# Patient Record
Sex: FEMALE | Race: OTHER
[De-identification: names, ages, dates, MRNs, and addresses within clinical notes are randomized per-mention and may not be internally consistent; named-entity substitution may affect disease eponyms.]

---

## 2017-02-21 ENCOUNTER — HOSPITAL ENCOUNTER (EMERGENCY)
Dept: HOSPITAL 54 - ER | Age: 52
Discharge: HOME | End: 2017-02-21
Payer: COMMERCIAL

## 2017-02-21 VITALS — DIASTOLIC BLOOD PRESSURE: 99 MMHG | SYSTOLIC BLOOD PRESSURE: 162 MMHG

## 2017-02-21 VITALS — BODY MASS INDEX: 24.99 KG/M2 | HEIGHT: 65 IN | WEIGHT: 150 LBS

## 2017-02-21 DIAGNOSIS — G89.4: ICD-10-CM

## 2017-02-21 DIAGNOSIS — G40.909: ICD-10-CM

## 2017-02-21 DIAGNOSIS — F13.239: Primary | ICD-10-CM

## 2017-02-21 DIAGNOSIS — F17.200: ICD-10-CM

## 2017-02-21 DIAGNOSIS — Z88.6: ICD-10-CM

## 2017-02-21 DIAGNOSIS — F41.9: ICD-10-CM

## 2017-02-21 LAB
*LACTIC ACID REFLEX FLAG: YES
ADD UA MICROSCOPIC: YES
ALBUMIN SERPL BCP-MCNC: 4.2 G/DL (ref 3.4–5)
ALT SERPL W P-5'-P-CCNC: 170 U/L (ref 12–78)
ANION GAP SERPL CALC-SCNC: 12 MMOL/L (ref 5–14)
AST SERPL W P-5'-P-CCNC: 132 U/L (ref 15–37)
BACTERIA UR CULT: NO
BASOPHILS # BLD AUTO: 0 /CMM (ref 0–0.2)
BASOPHILS NFR BLD AUTO: 0.6 % (ref 0–2)
BILIRUB DIRECT SERPL-MCNC: 0.3 MG/DL (ref 0–0.2)
BILIRUB INDIRECT SERPL-MCNC: 0.9 MG/DL (ref 0–1.1)
BILIRUB SERPL-MCNC: 1.2 MG/DL (ref 0.2–1)
BUN SERPL-MCNC: 11 MG/DL (ref 7–18)
CALCIUM SERPL-MCNC: 9.7 MG/DL (ref 8.5–10.1)
CHLORIDE SERPL-SCNC: 99 MMOL/L (ref 98–107)
CO2 SERPL-SCNC: 31 MMOL/L (ref 21–32)
CREAT SERPL-MCNC: 0.8 MG/DL (ref 0.6–1.3)
DIFF TOTAL %: 100 %
EOSINOPHIL # BLD AUTO: 0 /CMM (ref 0–0.7)
EOSINOPHIL NFR BLD AUTO: 0.2 % (ref 0–6)
GFR SERPLBLD BASED ON 1.73 SQ M-ARVRAT: 75 ML/MIN (ref 60–?)
GLUCOSE SERPL-MCNC: 121 MG/DL (ref 74–106)
HCT VFR BLD AUTO: 46 % (ref 33–45)
HGB BLD-MCNC: 15.1 G/DL (ref 11.5–14.8)
LACTATE SERPL-SCNC: 2.2 MMOL/L (ref 0.4–2)
LYMPHOCYTES NFR BLD AUTO: 0.9 /CMM (ref 0.8–4.8)
LYMPHOCYTES NFR BLD AUTO: 12.5 % (ref 20–44)
MCH RBC QN AUTO: 30 PG (ref 26–33)
MCHC RBC AUTO-ENTMCNC: 33 G/DL (ref 31–36)
MCV RBC AUTO: 91 FL (ref 82–100)
MONOCYTES NFR BLD AUTO: 0.6 /CMM (ref 0.1–1.3)
MONOCYTES NFR BLD AUTO: 9.2 % (ref 2–12)
NEUTROPHILS # BLD AUTO: 5.4 /CMM (ref 1.8–8.9)
NEUTROPHILS NFR BLD AUTO: 77.5 % (ref 43–81)
PH UR STRIP: 7 [PH] (ref 5–8)
PLATELET # BLD AUTO: 185 /CMM (ref 150–450)
POTASSIUM SERPL-SCNC: 4 MMOL/L (ref 3.5–5.1)
PROT SERPL-MCNC: 9.6 G/DL (ref 6.4–8.2)
RBC # BLD AUTO: 5.03 MIL/UL (ref 4–5.2)
RBC #/AREA URNS HPF: (no result) /HPF (ref 0–2)
SODIUM SERPL-SCNC: 138 MMOL/L (ref 136–145)
TROPONIN I SERPL-MCNC: < 0.017 NG/ML (ref 0–0.06)
WBC #/AREA URNS HPF: (no result) /HPF (ref 0–3)
WBC NRBC COR # BLD AUTO: 6.9 K/UL (ref 4.3–11)

## 2017-02-21 PROCEDURE — 80048 BASIC METABOLIC PNL TOTAL CA: CPT

## 2017-02-21 PROCEDURE — 81001 URINALYSIS AUTO W/SCOPE: CPT

## 2017-02-21 PROCEDURE — 85025 COMPLETE CBC W/AUTO DIFF WBC: CPT

## 2017-02-21 PROCEDURE — Z7610: HCPCS

## 2017-02-21 PROCEDURE — G0480 DRUG TEST DEF 1-7 CLASSES: HCPCS

## 2017-02-21 PROCEDURE — 83605 ASSAY OF LACTIC ACID: CPT

## 2017-02-21 PROCEDURE — 36415 COLL VENOUS BLD VENIPUNCTURE: CPT

## 2017-02-21 PROCEDURE — 96361 HYDRATE IV INFUSION ADD-ON: CPT

## 2017-02-21 PROCEDURE — 99285 EMERGENCY DEPT VISIT HI MDM: CPT

## 2017-02-21 PROCEDURE — 96374 THER/PROPH/DIAG INJ IV PUSH: CPT

## 2017-02-21 PROCEDURE — 80076 HEPATIC FUNCTION PANEL: CPT

## 2017-02-21 PROCEDURE — 70450 CT HEAD/BRAIN W/O DYE: CPT

## 2017-02-21 PROCEDURE — 84484 ASSAY OF TROPONIN QUANT: CPT

## 2017-02-21 PROCEDURE — 93005 ELECTROCARDIOGRAM TRACING: CPT

## 2017-02-21 PROCEDURE — A4606 OXYGEN PROBE USED W OXIMETER: HCPCS

## 2020-09-13 ENCOUNTER — HOSPITAL ENCOUNTER (INPATIENT)
Dept: HOSPITAL 12 - ER | Age: 55
LOS: 2 days | Discharge: NURSING, REHAB OR LONG-TERM CARE | DRG: 347 | End: 2020-09-15
Payer: COMMERCIAL

## 2020-09-13 VITALS — DIASTOLIC BLOOD PRESSURE: 77 MMHG | SYSTOLIC BLOOD PRESSURE: 142 MMHG

## 2020-09-13 VITALS — HEIGHT: 66 IN | WEIGHT: 155.13 LBS | BODY MASS INDEX: 24.93 KG/M2
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
## 2020-09-13 DIAGNOSIS — M47.896: ICD-10-CM

## 2020-09-13 DIAGNOSIS — I10: ICD-10-CM

## 2020-09-13 DIAGNOSIS — F12.90: ICD-10-CM

## 2020-09-13 DIAGNOSIS — Z79.891: ICD-10-CM
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
## 2020-09-13 DIAGNOSIS — F41.9: ICD-10-CM

## 2020-09-13 DIAGNOSIS — R74.0: ICD-10-CM

## 2020-09-13 DIAGNOSIS — Z79.899: ICD-10-CM
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
## 2020-09-13 DIAGNOSIS — G89.4: ICD-10-CM

## 2020-09-13 DIAGNOSIS — G40.909: ICD-10-CM

## 2020-09-13 DIAGNOSIS — M51.36: ICD-10-CM

## 2020-09-13 DIAGNOSIS — E66.9: ICD-10-CM

## 2020-09-13 DIAGNOSIS — S32.019A: Primary | ICD-10-CM

## 2020-09-13 DIAGNOSIS — R79.89: ICD-10-CM

## 2020-09-13 LAB
ALP SERPL-CCNC: 106 U/L (ref 50–136)
ALT SERPL W/O P-5'-P-CCNC: 214 U/L (ref 14–59)
AST SERPL-CCNC: 150 U/L (ref 15–37)
BASOPHILS # BLD AUTO: 0 K/UL (ref 0–8)
BASOPHILS NFR BLD AUTO: 0.5 % (ref 0–2)
BILIRUB SERPL-MCNC: 0.7 MG/DL (ref 0.2–1)
BUN SERPL-MCNC: 22 MG/DL (ref 7–18)
CHLORIDE SERPL-SCNC: 100 MMOL/L (ref 98–107)
CO2 SERPL-SCNC: 33 MMOL/L (ref 21–32)
CREAT SERPL-MCNC: 1.2 MG/DL (ref 0.6–1.3)
EOSINOPHIL # BLD AUTO: 0 K/UL (ref 0–0.7)
EOSINOPHIL NFR BLD AUTO: 0.2 % (ref 0–7)
GLUCOSE SERPL-MCNC: 86 MG/DL (ref 74–106)
HCT VFR BLD AUTO: 38.1 % (ref 31.2–41.9)
HGB BLD-MCNC: 13 G/DL (ref 10.9–14.3)
LYMPHOCYTES # BLD AUTO: 1.6 K/UL (ref 20–40)
LYMPHOCYTES NFR BLD AUTO: 17.4 % (ref 20.5–51.5)
MCH RBC QN AUTO: 31.1 UUG (ref 24.7–32.8)
MCHC RBC AUTO-ENTMCNC: 34 G/DL (ref 32.3–35.6)
MCV RBC AUTO: 91.1 FL (ref 75.5–95.3)
MONOCYTES # BLD AUTO: 1.1 K/UL (ref 2–10)
MONOCYTES NFR BLD AUTO: 11.4 % (ref 0–11)
NEUTROPHILS # BLD AUTO: 6.6 K/UL (ref 1.8–8.9)
NEUTROPHILS NFR BLD AUTO: 70.5 % (ref 38.5–71.5)
PLATELET # BLD AUTO: 201 K/UL (ref 179–408)
POTASSIUM SERPL-SCNC: 4.7 MMOL/L (ref 3.5–5.1)
RBC # BLD AUTO: 4.18 MIL/UL (ref 3.63–4.92)
WBC # BLD AUTO: 9.4 K/UL (ref 3.8–11.8)
WS STN SPEC: 9 G/DL (ref 6.4–8.2)

## 2020-09-13 PROCEDURE — G0378 HOSPITAL OBSERVATION PER HR: HCPCS

## 2020-09-13 PROCEDURE — A4663 DIALYSIS BLOOD PRESSURE CUFF: HCPCS

## 2020-09-13 RX ADMIN — LORAZEPAM PRN MG: 2 INJECTION INTRAMUSCULAR; INTRAVENOUS at 23:24

## 2020-09-13 RX ADMIN — DOCUSATE SODIUM SCH MG: 100 CAPSULE, LIQUID FILLED ORAL at 20:00

## 2020-09-13 RX ADMIN — HYDROMORPHONE HYDROCHLORIDE PRN MG: 1 INJECTION, SOLUTION INTRAMUSCULAR; INTRAVENOUS; SUBCUTANEOUS at 19:57

## 2020-09-14 VITALS — DIASTOLIC BLOOD PRESSURE: 66 MMHG | SYSTOLIC BLOOD PRESSURE: 120 MMHG

## 2020-09-14 VITALS — SYSTOLIC BLOOD PRESSURE: 98 MMHG | DIASTOLIC BLOOD PRESSURE: 62 MMHG

## 2020-09-14 VITALS — SYSTOLIC BLOOD PRESSURE: 115 MMHG | DIASTOLIC BLOOD PRESSURE: 77 MMHG

## 2020-09-14 VITALS — DIASTOLIC BLOOD PRESSURE: 66 MMHG | SYSTOLIC BLOOD PRESSURE: 122 MMHG

## 2020-09-14 LAB
BASOPHILS # BLD AUTO: 0 K/UL (ref 0–8)
BASOPHILS NFR BLD AUTO: 0.4 % (ref 0–2)
BUN SERPL-MCNC: 20 MG/DL (ref 7–18)
CHLORIDE SERPL-SCNC: 101 MMOL/L (ref 98–107)
CHOLEST SERPL-MCNC: 121 MG/DL (ref ?–200)
CO2 SERPL-SCNC: 33 MMOL/L (ref 21–32)
CREAT SERPL-MCNC: 1.1 MG/DL (ref 0.6–1.3)
EOSINOPHIL # BLD AUTO: 0.1 K/UL (ref 0–0.7)
EOSINOPHIL NFR BLD AUTO: 1.6 % (ref 0–7)
GLUCOSE SERPL-MCNC: 101 MG/DL (ref 74–106)
HCT VFR BLD AUTO: 37.5 % (ref 31.2–41.9)
HDLC SERPL-MCNC: 45 MG/DL (ref 40–60)
HGB BLD-MCNC: 12.5 G/DL (ref 10.9–14.3)
LYMPHOCYTES # BLD AUTO: 1.4 K/UL (ref 20–40)
LYMPHOCYTES NFR BLD AUTO: 23.4 % (ref 20.5–51.5)
MAGNESIUM SERPL-MCNC: 1.2 MG/DL (ref 1.8–2.4)
MCH RBC QN AUTO: 30.6 UUG (ref 24.7–32.8)
MCHC RBC AUTO-ENTMCNC: 33 G/DL (ref 32.3–35.6)
MCV RBC AUTO: 91.4 FL (ref 75.5–95.3)
MONOCYTES # BLD AUTO: 0.9 K/UL (ref 2–10)
MONOCYTES NFR BLD AUTO: 13.9 % (ref 0–11)
NEUTROPHILS # BLD AUTO: 3.7 K/UL (ref 1.8–8.9)
NEUTROPHILS NFR BLD AUTO: 60.7 % (ref 38.5–71.5)
PHOSPHATE SERPL-MCNC: 3.8 MG/DL (ref 2.5–4.9)
PLATELET # BLD AUTO: 165 K/UL (ref 179–408)
POTASSIUM SERPL-SCNC: 4.5 MMOL/L (ref 3.5–5.1)
RBC # BLD AUTO: 4.1 MIL/UL (ref 3.63–4.92)
TRIGL SERPL-MCNC: 51 MG/DL (ref 30–150)
WBC # BLD AUTO: 6.1 K/UL (ref 3.8–11.8)

## 2020-09-14 RX ADMIN — HYDROMORPHONE HYDROCHLORIDE PRN MG: 1 INJECTION, SOLUTION INTRAMUSCULAR; INTRAVENOUS; SUBCUTANEOUS at 11:54

## 2020-09-14 RX ADMIN — HYDROMORPHONE HYDROCHLORIDE PRN MG: 1 INJECTION, SOLUTION INTRAMUSCULAR; INTRAVENOUS; SUBCUTANEOUS at 01:20

## 2020-09-14 RX ADMIN — DOCUSATE SODIUM SCH MG: 100 CAPSULE, LIQUID FILLED ORAL at 20:05

## 2020-09-14 RX ADMIN — LORAZEPAM PRN MG: 2 INJECTION INTRAMUSCULAR; INTRAVENOUS at 19:47

## 2020-09-14 RX ADMIN — HYDROMORPHONE HYDROCHLORIDE PRN MG: 1 INJECTION, SOLUTION INTRAMUSCULAR; INTRAVENOUS; SUBCUTANEOUS at 06:19

## 2020-09-14 RX ADMIN — PANTOPRAZOLE SODIUM SCH MG: 40 TABLET, DELAYED RELEASE ORAL at 06:15

## 2020-09-15 VITALS — DIASTOLIC BLOOD PRESSURE: 94 MMHG | SYSTOLIC BLOOD PRESSURE: 138 MMHG

## 2020-09-15 VITALS — DIASTOLIC BLOOD PRESSURE: 76 MMHG | SYSTOLIC BLOOD PRESSURE: 126 MMHG

## 2020-09-15 VITALS — SYSTOLIC BLOOD PRESSURE: 148 MMHG | DIASTOLIC BLOOD PRESSURE: 102 MMHG

## 2020-09-15 LAB
BASOPHILS # BLD AUTO: 0 K/UL (ref 0–8)
BASOPHILS NFR BLD AUTO: 0.5 % (ref 0–2)
BUN SERPL-MCNC: 18 MG/DL (ref 7–18)
CHLORIDE SERPL-SCNC: 101 MMOL/L (ref 98–107)
CO2 SERPL-SCNC: 29 MMOL/L (ref 21–32)
CREAT SERPL-MCNC: 1 MG/DL (ref 0.6–1.3)
EOSINOPHIL # BLD AUTO: 0.1 K/UL (ref 0–0.7)
EOSINOPHIL NFR BLD AUTO: 1.5 % (ref 0–7)
GLUCOSE SERPL-MCNC: 85 MG/DL (ref 74–106)
HCT VFR BLD AUTO: 38 % (ref 31.2–41.9)
HGB BLD-MCNC: 12.9 G/DL (ref 10.9–14.3)
LYMPHOCYTES # BLD AUTO: 1.7 K/UL (ref 20–40)
LYMPHOCYTES NFR BLD AUTO: 28.3 % (ref 20.5–51.5)
MCH RBC QN AUTO: 31.2 UUG (ref 24.7–32.8)
MCHC RBC AUTO-ENTMCNC: 34 G/DL (ref 32.3–35.6)
MCV RBC AUTO: 92 FL (ref 75.5–95.3)
MONOCYTES # BLD AUTO: 0.8 K/UL (ref 2–10)
MONOCYTES NFR BLD AUTO: 13 % (ref 0–11)
NEUTROPHILS # BLD AUTO: 3.5 K/UL (ref 1.8–8.9)
NEUTROPHILS NFR BLD AUTO: 56.7 % (ref 38.5–71.5)
PLATELET # BLD AUTO: 182 K/UL (ref 179–408)
POTASSIUM SERPL-SCNC: 4.3 MMOL/L (ref 3.5–5.1)
RBC # BLD AUTO: 4.13 MIL/UL (ref 3.63–4.92)
WBC # BLD AUTO: 6.1 K/UL (ref 3.8–11.8)

## 2020-09-15 RX ADMIN — HYDROMORPHONE HYDROCHLORIDE PRN MG: 1 INJECTION, SOLUTION INTRAMUSCULAR; INTRAVENOUS; SUBCUTANEOUS at 00:55

## 2020-09-15 RX ADMIN — HYDROMORPHONE HYDROCHLORIDE PRN MG: 1 INJECTION, SOLUTION INTRAMUSCULAR; INTRAVENOUS; SUBCUTANEOUS at 15:43

## 2020-09-15 RX ADMIN — PANTOPRAZOLE SODIUM SCH MG: 40 TABLET, DELAYED RELEASE ORAL at 06:04

## 2020-09-15 RX ADMIN — HYDROMORPHONE HYDROCHLORIDE PRN MG: 1 INJECTION, SOLUTION INTRAMUSCULAR; INTRAVENOUS; SUBCUTANEOUS at 08:24

## 2022-02-16 ENCOUNTER — HOSPITAL ENCOUNTER (EMERGENCY)
Dept: HOSPITAL 54 - ER | Age: 57
Discharge: LEFT BEFORE DISCHARGE | End: 2022-02-16
Payer: COMMERCIAL

## 2022-02-16 VITALS — HEIGHT: 66 IN | WEIGHT: 180 LBS | BODY MASS INDEX: 28.93 KG/M2

## 2022-02-16 VITALS — SYSTOLIC BLOOD PRESSURE: 150 MMHG | DIASTOLIC BLOOD PRESSURE: 93 MMHG

## 2022-02-16 DIAGNOSIS — Z76.0: ICD-10-CM

## 2022-02-16 DIAGNOSIS — F41.9: ICD-10-CM

## 2022-02-16 DIAGNOSIS — Z53.21: Primary | ICD-10-CM

## 2022-02-16 NOTE — NUR
PT WAAS INFORMED THAT SHE WILL NOT BE PRESCRIBED W/ REQUESTED MEDICATION. LEFT 
WITHOUT BEING SEEN BY ED PROVIDER.

## 2022-05-22 ENCOUNTER — HOSPITAL ENCOUNTER (INPATIENT)
Dept: HOSPITAL 54 - ER | Age: 57
LOS: 3 days | Discharge: NURSING, REHAB OR LONG-TERM CARE | DRG: 207 | End: 2022-05-25
Attending: INTERNAL MEDICINE | Admitting: INTERNAL MEDICINE
Payer: COMMERCIAL

## 2022-05-22 VITALS — DIASTOLIC BLOOD PRESSURE: 69 MMHG | SYSTOLIC BLOOD PRESSURE: 116 MMHG

## 2022-05-22 VITALS — DIASTOLIC BLOOD PRESSURE: 64 MMHG | SYSTOLIC BLOOD PRESSURE: 100 MMHG

## 2022-05-22 VITALS — DIASTOLIC BLOOD PRESSURE: 70 MMHG | SYSTOLIC BLOOD PRESSURE: 108 MMHG

## 2022-05-22 VITALS — SYSTOLIC BLOOD PRESSURE: 88 MMHG | DIASTOLIC BLOOD PRESSURE: 53 MMHG

## 2022-05-22 VITALS — DIASTOLIC BLOOD PRESSURE: 61 MMHG | SYSTOLIC BLOOD PRESSURE: 97 MMHG

## 2022-05-22 VITALS — SYSTOLIC BLOOD PRESSURE: 96 MMHG | DIASTOLIC BLOOD PRESSURE: 61 MMHG

## 2022-05-22 VITALS — DIASTOLIC BLOOD PRESSURE: 66 MMHG | SYSTOLIC BLOOD PRESSURE: 96 MMHG

## 2022-05-22 VITALS — SYSTOLIC BLOOD PRESSURE: 106 MMHG | DIASTOLIC BLOOD PRESSURE: 66 MMHG

## 2022-05-22 VITALS — DIASTOLIC BLOOD PRESSURE: 63 MMHG | SYSTOLIC BLOOD PRESSURE: 95 MMHG

## 2022-05-22 VITALS — DIASTOLIC BLOOD PRESSURE: 44 MMHG | SYSTOLIC BLOOD PRESSURE: 91 MMHG

## 2022-05-22 VITALS — SYSTOLIC BLOOD PRESSURE: 91 MMHG | DIASTOLIC BLOOD PRESSURE: 60 MMHG

## 2022-05-22 VITALS — SYSTOLIC BLOOD PRESSURE: 94 MMHG | DIASTOLIC BLOOD PRESSURE: 68 MMHG

## 2022-05-22 VITALS — WEIGHT: 208 LBS | HEIGHT: 66 IN | BODY MASS INDEX: 33.43 KG/M2

## 2022-05-22 VITALS — DIASTOLIC BLOOD PRESSURE: 56 MMHG | SYSTOLIC BLOOD PRESSURE: 91 MMHG

## 2022-05-22 VITALS — DIASTOLIC BLOOD PRESSURE: 64 MMHG | SYSTOLIC BLOOD PRESSURE: 98 MMHG

## 2022-05-22 VITALS — SYSTOLIC BLOOD PRESSURE: 96 MMHG | DIASTOLIC BLOOD PRESSURE: 55 MMHG

## 2022-05-22 VITALS — SYSTOLIC BLOOD PRESSURE: 87 MMHG | DIASTOLIC BLOOD PRESSURE: 51 MMHG

## 2022-05-22 VITALS — DIASTOLIC BLOOD PRESSURE: 49 MMHG | SYSTOLIC BLOOD PRESSURE: 91 MMHG

## 2022-05-22 VITALS — SYSTOLIC BLOOD PRESSURE: 116 MMHG | DIASTOLIC BLOOD PRESSURE: 69 MMHG

## 2022-05-22 VITALS — DIASTOLIC BLOOD PRESSURE: 63 MMHG | SYSTOLIC BLOOD PRESSURE: 93 MMHG

## 2022-05-22 VITALS — DIASTOLIC BLOOD PRESSURE: 58 MMHG | SYSTOLIC BLOOD PRESSURE: 98 MMHG

## 2022-05-22 DIAGNOSIS — R29.6: ICD-10-CM

## 2022-05-22 DIAGNOSIS — T46.5X5A: ICD-10-CM
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
## 2022-05-22 DIAGNOSIS — I95.2: Primary | ICD-10-CM

## 2022-05-22 DIAGNOSIS — N17.0: ICD-10-CM

## 2022-05-22 DIAGNOSIS — Z79.891: ICD-10-CM

## 2022-05-22 DIAGNOSIS — G40.909: ICD-10-CM

## 2022-05-22 DIAGNOSIS — Z79.899: ICD-10-CM

## 2022-05-22 DIAGNOSIS — E86.0: ICD-10-CM
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
## 2022-05-22 DIAGNOSIS — G89.4: ICD-10-CM

## 2022-05-22 DIAGNOSIS — M79.662: ICD-10-CM

## 2022-05-22 DIAGNOSIS — E87.1: ICD-10-CM

## 2022-05-22 DIAGNOSIS — E87.2: ICD-10-CM
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
## 2022-05-22 DIAGNOSIS — R00.1: ICD-10-CM

## 2022-05-22 DIAGNOSIS — E86.1: ICD-10-CM

## 2022-05-22 DIAGNOSIS — E88.09: ICD-10-CM

## 2022-05-22 DIAGNOSIS — I10: ICD-10-CM

## 2022-05-22 LAB
ALBUMIN SERPL BCP-MCNC: 3.7 G/DL (ref 3.4–5)
ALP SERPL-CCNC: 81 U/L (ref 46–116)
ALT SERPL W P-5'-P-CCNC: 33 U/L (ref 12–78)
AST SERPL W P-5'-P-CCNC: 34 U/L (ref 15–37)
BASOPHILS # BLD AUTO: 0.1 K/UL (ref 0–0.2)
BASOPHILS NFR BLD AUTO: 0.9 % (ref 0–2)
BILIRUB DIRECT SERPL-MCNC: 0.3 MG/DL (ref 0–0.2)
BILIRUB SERPL-MCNC: 0.7 MG/DL (ref 0.2–1)
BILIRUB UR QL STRIP: (no result)
BUN SERPL-MCNC: 29 MG/DL (ref 7–18)
CALCIUM SERPL-MCNC: 8.6 MG/DL (ref 8.5–10.1)
CHLORIDE SERPL-SCNC: 98 MMOL/L (ref 98–107)
CO2 SERPL-SCNC: 28 MMOL/L (ref 21–32)
COLOR UR: YELLOW
CREAT SERPL-MCNC: 1.9 MG/DL (ref 0.6–1.3)
EOSINOPHIL NFR BLD AUTO: 2 % (ref 0–6)
ETHANOL SERPL-MCNC: < 3 MG/DL (ref 0–0)
GLUCOSE SERPL-MCNC: 115 MG/DL (ref 74–106)
GLUCOSE UR STRIP-MCNC: NEGATIVE MG/DL
HCT VFR BLD AUTO: 38 % (ref 33–45)
HGB BLD-MCNC: 12.7 G/DL (ref 11.5–14.8)
LEUKOCYTE ESTERASE UR QL STRIP: (no result)
LYMPHOCYTES NFR BLD AUTO: 2.1 K/UL (ref 0.8–4.8)
LYMPHOCYTES NFR BLD AUTO: 26.7 % (ref 20–44)
MCHC RBC AUTO-ENTMCNC: 33 G/DL (ref 31–36)
MCV RBC AUTO: 95 FL (ref 82–100)
MONOCYTES NFR BLD AUTO: 1.1 K/UL (ref 0.1–1.3)
MONOCYTES NFR BLD AUTO: 14.1 % (ref 2–12)
NEUTROPHILS # BLD AUTO: 4.5 K/UL (ref 1.8–8.9)
NEUTROPHILS NFR BLD AUTO: 56.3 % (ref 43–81)
NITRITE UR QL STRIP: NEGATIVE
PH UR STRIP: 5.5 [PH] (ref 5–8)
PLATELET # BLD AUTO: 201 K/UL (ref 150–450)
POTASSIUM SERPL-SCNC: 4.1 MMOL/L (ref 3.5–5.1)
PROT SERPL-MCNC: 8.5 G/DL (ref 6.4–8.2)
PROT UR QL STRIP: NEGATIVE MG/DL
RBC # BLD AUTO: 4.01 MIL/UL (ref 4–5.2)
RBC #/AREA URNS HPF: (no result) /HPF (ref 0–2)
SODIUM SERPL-SCNC: 132 MMOL/L (ref 136–145)
TSH SERPL DL<=0.005 MIU/L-ACNC: 4.33 UIU/ML (ref 0.36–3.74)
UROBILINOGEN UR STRIP-MCNC: 1 EU/DL
WBC #/AREA URNS HPF: (no result) /HPF (ref 0–3)
WBC NRBC COR # BLD AUTO: 8 K/UL (ref 4.3–11)

## 2022-05-22 PROCEDURE — C9803 HOPD COVID-19 SPEC COLLECT: HCPCS

## 2022-05-22 PROCEDURE — 02HV33Z INSERTION OF INFUSION DEVICE INTO SUPERIOR VENA CAVA, PERCUTANEOUS APPROACH: ICD-10-PCS | Performed by: NURSE PRACTITIONER

## 2022-05-22 PROCEDURE — B548ZZA ULTRASONOGRAPHY OF SUPERIOR VENA CAVA, GUIDANCE: ICD-10-PCS | Performed by: NURSE PRACTITIONER

## 2022-05-22 PROCEDURE — G0480 DRUG TEST DEF 1-7 CLASSES: HCPCS

## 2022-05-22 PROCEDURE — G0378 HOSPITAL OBSERVATION PER HR: HCPCS

## 2022-05-22 RX ADMIN — LEVETIRACETAM SCH MG: 250 TABLET, FILM COATED ORAL at 21:34

## 2022-05-22 RX ADMIN — HEPARIN SODIUM SCH UNITS: 5000 INJECTION INTRAVENOUS; SUBCUTANEOUS at 21:34

## 2022-05-22 RX ADMIN — GABAPENTIN SCH MG: 400 CAPSULE ORAL at 21:36

## 2022-05-22 RX ADMIN — TRAZODONE HYDROCHLORIDE SCH MG: 50 TABLET ORAL at 18:35

## 2022-05-22 RX ADMIN — SODIUM CHLORIDE SCH MLS/HR: 9 INJECTION, SOLUTION INTRAVENOUS at 13:14

## 2022-05-22 RX ADMIN — SODIUM CHLORIDE SCH MLS/HR: 9 INJECTION, SOLUTION INTRAVENOUS at 21:27

## 2022-05-22 RX ADMIN — Medication PRN MG: at 18:51

## 2022-05-22 RX ADMIN — DOCUSATE SODIUM SCH MG: 50 LIQUID ORAL at 18:35

## 2022-05-22 NOTE — NUR
RN ADMISSION NOTES

PATIENT ADMITTED FROM ER 56Y/OLD FEMALE ON DX OF SHOCK, BRADYCARDIA. PATIENT A/O X3, HR -45 
ON BEDSIDE MONITOR, WAS COMPLAINING OF PAIN BILATERAL LOWER EXTREMITIES 6/10 PER PAIN SCALE. 
NO ACUTE RESPIRATORY DISTRESS O2-3LNC. SKIN ASSESSMENT DONE INTACT. PATIENT USING BEDPAN. 
INFUSING NS @125 ML/HR ON PAULA PICC LINE INTACT. CALL LIGHT WITHIN TO REACH. DUE MEDICATION 
ADMINISTERED, BED ALARM ON. WILL FOLLOW UP.

## 2022-05-22 NOTE — NUR
ICU RN NOTES:



RECEIVED PATIENT IN BED AWAKE, ALERT/ORIENTED X4 AND VERBALLY RESPONSIVE. ON 3L/MIN VIA N/C, 
O2 SAT-99%. IV ACCESS ON PAULA PICC INTACT AND PATENT. RUNNING NS 125CC/HR. STILL C/O PAIN ON 
BOTH LOWER EXTREMITIES. MORPHINE GIVEN DURING PREVIOUS SHIFT. PT ABLE TO USE BEDPAN. NO 
ACUTE DISTRESS. ALL AFETY MEASURES IN PLACE. ON FALL PRECAUTION. BED IN LOWEST POSITION AND 
LOCKED. SIDE RAILS UP X2, PLACE CALL LIGHT WITH IN REACH. WILL CONTINUE TO MONITOR

## 2022-05-22 NOTE — NUR
RN NOTES 

ADMINISTERED MORPHINE SULFATE  2MG/ML IV PUSH FOR BILATERAL LOWER LEGS PAIN 6/10 PER PATIENT 
REQUEST, /69, P-52, R-18. ENDORSED ONCOMING NURSE FOLLOW UP.

## 2022-05-23 VITALS — SYSTOLIC BLOOD PRESSURE: 119 MMHG | DIASTOLIC BLOOD PRESSURE: 33 MMHG

## 2022-05-23 VITALS — SYSTOLIC BLOOD PRESSURE: 97 MMHG | DIASTOLIC BLOOD PRESSURE: 52 MMHG

## 2022-05-23 VITALS — DIASTOLIC BLOOD PRESSURE: 35 MMHG | SYSTOLIC BLOOD PRESSURE: 92 MMHG

## 2022-05-23 VITALS — DIASTOLIC BLOOD PRESSURE: 58 MMHG | SYSTOLIC BLOOD PRESSURE: 109 MMHG

## 2022-05-23 VITALS — DIASTOLIC BLOOD PRESSURE: 76 MMHG | SYSTOLIC BLOOD PRESSURE: 123 MMHG

## 2022-05-23 VITALS — SYSTOLIC BLOOD PRESSURE: 69 MMHG | DIASTOLIC BLOOD PRESSURE: 31 MMHG

## 2022-05-23 VITALS — DIASTOLIC BLOOD PRESSURE: 56 MMHG | SYSTOLIC BLOOD PRESSURE: 103 MMHG

## 2022-05-23 VITALS — SYSTOLIC BLOOD PRESSURE: 97 MMHG | DIASTOLIC BLOOD PRESSURE: 63 MMHG

## 2022-05-23 VITALS — SYSTOLIC BLOOD PRESSURE: 94 MMHG | DIASTOLIC BLOOD PRESSURE: 61 MMHG

## 2022-05-23 VITALS — SYSTOLIC BLOOD PRESSURE: 87 MMHG | DIASTOLIC BLOOD PRESSURE: 56 MMHG

## 2022-05-23 VITALS — DIASTOLIC BLOOD PRESSURE: 43 MMHG | SYSTOLIC BLOOD PRESSURE: 101 MMHG

## 2022-05-23 VITALS — DIASTOLIC BLOOD PRESSURE: 59 MMHG | SYSTOLIC BLOOD PRESSURE: 74 MMHG

## 2022-05-23 VITALS — DIASTOLIC BLOOD PRESSURE: 69 MMHG | SYSTOLIC BLOOD PRESSURE: 99 MMHG

## 2022-05-23 VITALS — DIASTOLIC BLOOD PRESSURE: 56 MMHG | SYSTOLIC BLOOD PRESSURE: 94 MMHG

## 2022-05-23 VITALS — DIASTOLIC BLOOD PRESSURE: 55 MMHG | SYSTOLIC BLOOD PRESSURE: 88 MMHG

## 2022-05-23 VITALS — SYSTOLIC BLOOD PRESSURE: 87 MMHG | DIASTOLIC BLOOD PRESSURE: 63 MMHG

## 2022-05-23 VITALS — SYSTOLIC BLOOD PRESSURE: 110 MMHG | DIASTOLIC BLOOD PRESSURE: 82 MMHG

## 2022-05-23 VITALS — DIASTOLIC BLOOD PRESSURE: 64 MMHG | SYSTOLIC BLOOD PRESSURE: 103 MMHG

## 2022-05-23 VITALS — DIASTOLIC BLOOD PRESSURE: 48 MMHG | SYSTOLIC BLOOD PRESSURE: 94 MMHG

## 2022-05-23 VITALS — SYSTOLIC BLOOD PRESSURE: 96 MMHG | DIASTOLIC BLOOD PRESSURE: 60 MMHG

## 2022-05-23 VITALS — DIASTOLIC BLOOD PRESSURE: 61 MMHG | SYSTOLIC BLOOD PRESSURE: 101 MMHG

## 2022-05-23 VITALS — DIASTOLIC BLOOD PRESSURE: 56 MMHG | SYSTOLIC BLOOD PRESSURE: 89 MMHG

## 2022-05-23 VITALS — SYSTOLIC BLOOD PRESSURE: 97 MMHG | DIASTOLIC BLOOD PRESSURE: 64 MMHG

## 2022-05-23 VITALS — SYSTOLIC BLOOD PRESSURE: 97 MMHG | DIASTOLIC BLOOD PRESSURE: 62 MMHG

## 2022-05-23 VITALS — SYSTOLIC BLOOD PRESSURE: 110 MMHG | DIASTOLIC BLOOD PRESSURE: 79 MMHG

## 2022-05-23 VITALS — SYSTOLIC BLOOD PRESSURE: 110 MMHG | DIASTOLIC BLOOD PRESSURE: 80 MMHG

## 2022-05-23 VITALS — DIASTOLIC BLOOD PRESSURE: 66 MMHG | SYSTOLIC BLOOD PRESSURE: 101 MMHG

## 2022-05-23 VITALS — DIASTOLIC BLOOD PRESSURE: 73 MMHG | SYSTOLIC BLOOD PRESSURE: 105 MMHG

## 2022-05-23 VITALS — SYSTOLIC BLOOD PRESSURE: 94 MMHG | DIASTOLIC BLOOD PRESSURE: 72 MMHG

## 2022-05-23 VITALS — SYSTOLIC BLOOD PRESSURE: 91 MMHG | DIASTOLIC BLOOD PRESSURE: 55 MMHG

## 2022-05-23 VITALS — SYSTOLIC BLOOD PRESSURE: 104 MMHG | DIASTOLIC BLOOD PRESSURE: 68 MMHG

## 2022-05-23 LAB
ALBUMIN SERPL BCP-MCNC: 2.9 G/DL (ref 3.4–5)
ALP SERPL-CCNC: 68 U/L (ref 46–116)
ALT SERPL W P-5'-P-CCNC: 33 U/L (ref 12–78)
AST SERPL W P-5'-P-CCNC: 29 U/L (ref 15–37)
BASOPHILS # BLD AUTO: 0 K/UL (ref 0–0.2)
BASOPHILS NFR BLD AUTO: 0.5 % (ref 0–2)
BILIRUB SERPL-MCNC: 0.6 MG/DL (ref 0.2–1)
BUN SERPL-MCNC: 18 MG/DL (ref 7–18)
CALCIUM SERPL-MCNC: 7.4 MG/DL (ref 8.5–10.1)
CHLORIDE SERPL-SCNC: 108 MMOL/L (ref 98–107)
CO2 SERPL-SCNC: 26 MMOL/L (ref 21–32)
CREAT SERPL-MCNC: 1 MG/DL (ref 0.6–1.3)
EOSINOPHIL NFR BLD AUTO: 1.4 % (ref 0–6)
GLUCOSE SERPL-MCNC: 95 MG/DL (ref 74–106)
HCT VFR BLD AUTO: 33 % (ref 33–45)
HGB BLD-MCNC: 11.1 G/DL (ref 11.5–14.8)
LYMPHOCYTES NFR BLD AUTO: 1.5 K/UL (ref 0.8–4.8)
LYMPHOCYTES NFR BLD AUTO: 23 % (ref 20–44)
MAGNESIUM SERPL-MCNC: 1.1 MG/DL (ref 1.8–2.4)
MCHC RBC AUTO-ENTMCNC: 34 G/DL (ref 31–36)
MCV RBC AUTO: 94 FL (ref 82–100)
MONOCYTES NFR BLD AUTO: 0.9 K/UL (ref 0.1–1.3)
MONOCYTES NFR BLD AUTO: 13.3 % (ref 2–12)
NEUTROPHILS # BLD AUTO: 4.1 K/UL (ref 1.8–8.9)
NEUTROPHILS NFR BLD AUTO: 61.8 % (ref 43–81)
PHOSPHATE SERPL-MCNC: 2.9 MG/DL (ref 2.5–4.9)
PLATELET # BLD AUTO: 141 K/UL (ref 150–450)
POTASSIUM SERPL-SCNC: 4.5 MMOL/L (ref 3.5–5.1)
PROT SERPL-MCNC: 6.8 G/DL (ref 6.4–8.2)
RBC # BLD AUTO: 3.46 MIL/UL (ref 4–5.2)
SODIUM SERPL-SCNC: 139 MMOL/L (ref 136–145)
WBC NRBC COR # BLD AUTO: 6.6 K/UL (ref 4.3–11)

## 2022-05-23 RX ADMIN — HEPARIN SODIUM SCH UNITS: 5000 INJECTION INTRAVENOUS; SUBCUTANEOUS at 08:38

## 2022-05-23 RX ADMIN — MAGNESIUM SULFATE IN DEXTROSE SCH MLS/HR: 10 INJECTION, SOLUTION INTRAVENOUS at 10:32

## 2022-05-23 RX ADMIN — GABAPENTIN SCH MG: 400 CAPSULE ORAL at 09:23

## 2022-05-23 RX ADMIN — CEFEPIME HYDROCHLORIDE SCH MLS/HR: 1 INJECTION, POWDER, FOR SOLUTION INTRAMUSCULAR; INTRAVENOUS at 09:13

## 2022-05-23 RX ADMIN — TRAZODONE HYDROCHLORIDE SCH MG: 50 TABLET ORAL at 08:36

## 2022-05-23 RX ADMIN — POLYETHYLENE GLYCOL 3350 SCH GM: 17 POWDER, FOR SOLUTION ORAL at 08:37

## 2022-05-23 RX ADMIN — MIDODRINE HYDROCHLORIDE SCH MG: 5 TABLET ORAL at 09:22

## 2022-05-23 RX ADMIN — CITALOPRAM HYDROBROMIDE SCH MG: 10 TABLET ORAL at 08:36

## 2022-05-23 RX ADMIN — Medication PRN MG: at 02:18

## 2022-05-23 RX ADMIN — SODIUM CHLORIDE SCH MLS/HR: 9 INJECTION, SOLUTION INTRAVENOUS at 05:19

## 2022-05-23 RX ADMIN — DOCUSATE SODIUM SCH MG: 50 LIQUID ORAL at 08:35

## 2022-05-23 RX ADMIN — MAGNESIUM SULFATE IN DEXTROSE SCH MLS/HR: 10 INJECTION, SOLUTION INTRAVENOUS at 06:15

## 2022-05-23 RX ADMIN — HEPARIN SODIUM SCH UNITS: 5000 INJECTION INTRAVENOUS; SUBCUTANEOUS at 21:41

## 2022-05-23 RX ADMIN — SODIUM CHLORIDE SCH MLS/HR: 9 INJECTION, SOLUTION INTRAVENOUS at 17:31

## 2022-05-23 RX ADMIN — MAGNESIUM SULFATE IN DEXTROSE SCH MLS/HR: 10 INJECTION, SOLUTION INTRAVENOUS at 07:52

## 2022-05-23 RX ADMIN — LISINOPRIL SCH MG: 20 TABLET ORAL at 08:45

## 2022-05-23 RX ADMIN — LEVETIRACETAM SCH MG: 250 TABLET, FILM COATED ORAL at 08:36

## 2022-05-23 RX ADMIN — MIDODRINE HYDROCHLORIDE SCH MG: 5 TABLET ORAL at 16:44

## 2022-05-23 RX ADMIN — CEFEPIME HYDROCHLORIDE SCH MLS/HR: 1 INJECTION, POWDER, FOR SOLUTION INTRAMUSCULAR; INTRAVENOUS at 21:39

## 2022-05-23 RX ADMIN — GABAPENTIN SCH MG: 400 CAPSULE ORAL at 21:39

## 2022-05-23 RX ADMIN — Medication PRN MG: at 22:50

## 2022-05-23 RX ADMIN — DOCUSATE SODIUM SCH MG: 50 LIQUID ORAL at 16:43

## 2022-05-23 RX ADMIN — MIDODRINE HYDROCHLORIDE SCH MG: 5 TABLET ORAL at 12:45

## 2022-05-23 RX ADMIN — Medication PRN MG: at 13:27

## 2022-05-23 RX ADMIN — MAGNESIUM SULFATE IN DEXTROSE SCH MLS/HR: 10 INJECTION, SOLUTION INTRAVENOUS at 09:19

## 2022-05-23 RX ADMIN — LEVETIRACETAM SCH MG: 250 TABLET, FILM COATED ORAL at 21:39

## 2022-05-23 RX ADMIN — TRAZODONE HYDROCHLORIDE SCH MG: 50 TABLET ORAL at 16:44

## 2022-05-23 RX ADMIN — Medication PRN MG: at 07:53

## 2022-05-23 NOTE — NUR
RN NOTES



RECEIVED BEDSIDE REPORT FROM DARRELL APONTE. PATIENT AWAKE, ALERT/ORIENTED X3-4 AND VERBALLY 
RESPONSIVE, ABLE TO MAKE NEEDS KNOWN.  ON 3LPM VIA N/C, O2 SAT-98%. BREATHING EVEN AND 
UNLABORED. NO SIGNS OF ANY ACUTE DISTRESS OR SOB NOTED AT THE TIME.  IV ACCESS ON PAULA PICC 
INTACT AND PATENT INFUSING NS 125CC/HR AND MG 1 GM IV. NO SIGNS OF INFILTRATION ON SITE.  NO 
C/O PAIN OR DISCOMFORT. ALL SAFETY MEASURES IMPLEMENTED HOB ELEVATED, BED IN LOWEST POSITION 
AND LOCKED. SIDE RAILS UP X2, PLACE CALL LIGHT WITH IN REACH. ALL NEEDS ANTICIPATED. WILL 
CONTINUE TO MONITOR AND ASSESS FOR ANY CHANGES DURING SHIFT.

## 2022-05-23 NOTE — NUR
RN NOTES:



PT C/O BLE PAIN, 8/10 PAIN SCALE. MORPHINE GIVEN AS PRN ORDER. PT TOLERATED WELL. WILL 
CONTINUE TO MONITOR

## 2022-05-23 NOTE — NUR
RECEIVED PT. RESTING COMFORTABLY ON BED, ON NASAL CANNULA AT 3LPM, O2SAT AT 96%; PT. ABLE TO 
MAKE NEEDS KNOWN; NO APPARENT DISTRESS NOTED. WILL CONTINUE PT. MONITORING, CALL LIGHT IN 
REACH.

## 2022-05-23 NOTE — NUR
BEDSIDE REPORT GIVEN TO KATHLEEN; PATIENT IN STABLE CONDITION, NO APPARENT DISTRESS NOTED 
AT THIS TIME; TRANSFERRED TO [facility] PER HOSPITAL PROTOCOL/POLICY. ENDORSED TO KATHLEEN FOR 
CONTINUITY OF CARE.

## 2022-05-23 NOTE — NUR
TELE RN NOTE

PT IN BED AWAKE. A/O X 4, NO SOB, NO DISTRESS OR DISCOMFORT NOTED. DENIES PAIN. PAULA TRIPLE 
LUMEN CATH INTACT AND PATENT INFUSING NS @ 125 ML/HR, LT WRIST #20 G SL INTACT AND PATENT, 
NO S/S OF INFILTRATION NOTED. ON TELE MONITOR SB WITH 1ST DEGREE AV BLOCK, BBB HR 58. KEPT 
HER DRY AND CLEAN. ALL NEEDS ATTENDED. VSS. CONTINUE TO MONITOR HER.

## 2022-05-23 NOTE — NUR
RN NOTES:



RECEIVED CRITICAL LAB RESULT, MG LEVEL 1.1. NOTIFIED DR. BARBER, ORDERED- MG 2 GM IV. ORDER 
NOTED AND CARRIED OUT. WILL CONTINUE TO MONITOR

## 2022-05-23 NOTE — NUR
SS Note:



Pt. Is a 56-year-old female who demonstrates adequate insight to the reason for 
hospitalization. Per EMR, pt. presents to the ER for left toe pain. Pt.s friend is at 
bedside. Pt. was oriented x4, alert, and cooperative. During interview, pt. was capable of 
following directions and appeared unkempt. Pt.s speech was at a normal rate and pt.s mood 
was elevated. Pt. reported no hx of mental health, substance abuse, suicidal ideation, or 
homicidal ideation. Pt. denies auditory hallucinations, visual hallucinations, paranoia, or 
delusions. SW explored pt.s living situation. Per pt., she lives alone [[address] [place] [address]]. Pt. does not have a caregiver but has adequate support 
from her family. Pt. stated that she has many falls in the past. Pt. explained her most 
recent fall. Per pt., she was in the kitchen and all the sudden her foot went numb. Pt. fell 
to the floor and crawled to her bed. Pt. did not call the ambulance because she thinks she 
will be fine the next day. The next day, pt. felt pain in her legs and could not walk, so 
she decided to go to the ER. Pt. stated that she sees a psychiatrist [pt. could not remember 
name] for her anxiety. Pt. states that she takes Clonidine daily and has been taking it for 
25 years. Pt. stated that she needs it and its not a problem. Pt. is non ambulatory and 
uses a walker as needed.



Plan: SW provided available resources and pt. accepted. Per case management note, pt. will 
be going to a SNF upon discharge.  

 

Resources Provided:



Substance Abuse resources provided included: 

[facility] Substance Abuse Self-Helpline (SAS) ([phone] 824-3527; CRI -HELP [address] 
[facility] [place] [address] [phone]; [facility] [address] 
[facility] [address] [phone]; [facility] Rehabilitation Program [address] 
[facility] [address] [phone]; [facility] [place] [address] [address] [phone];  [facility] [address] 
[facility] [id number] [phone]; [facility] [address] [facility] [id number] [phone]; [facility] Substance Abuse Helpline(SAS)-[facility] [phone]; Action 
Family Counseling  [phone]; [place] [phone] [place]; [facility]  
[phone] [place]; [facility]Help  [phone] [place]; I-ADARP Inter Agency 
Drug Abuse Recovery [phone] [place]; [facility]  [phone] [place]; 
[place] [phone] [place]; [facility] [phone] [place]; Located within [facility]. [phone] [place]; Alcoholics Anonymous (089) 566-7150-SFV; 
Ds-Hcpu-Mrwotww ([phone]; Marijuana Anonymous (140) 062-3815-SFV; Narcotics Anonymous 
www.na.org;

ABUSE PREVENTION: 

ELDER ABUSE HOTLINE (24/7) [phone]

ADULT PROTECTIVE SERVICES HOTLINE [phone]

LONG-TERM CARE [facility] [phone]  [facility]

AREA ON AGING (HOTLINE) [phone]



ADULT DAY HEALTH CARE CARE CENTERS: 

Private pay or Medi-lexi funded adult day care

 [facility] [phone]

 [facility] [phone], [facility] [phone], [facility] [phone], [facility] Adult [facility] Health Care [phone], [facility] Adult Day Health Care [phone], [facility]  Center [phone], Aravind

[facility] [phone], [place]

[facility] [phone], [place]



ALZHEIMERS DISEASE/DEMENTIA: 

Alzheimers Association Helpline (662) 423-9060

 [facility] Chapter (102) 394-9630 www.alz.org/[facility] Department of Aging (488) 842-2122 www.lacity.org

 Family Caregiver [place] (740) 579-5967 www.caregiver.org

 [place] Caregiver Resources Center/Family Support (482) [phone] www.[facility].org

CANCER RESOURCES: 

American Cancer Society (132) 298-4359 www.cancer.org

 Cancer Support Community (309) 016-8044 www.CancerSupportVvsb.org: CancerCare (894) 602-1637 www.cancercare.org

 [facility] Cancer Support Hope (043) 649-4503 www.Leapset.org



On license of [facility] HEALTH ASSOCIATIONS:

AARP (595) 349-5585 www.aarp.org

 ALS Association (904) 690-5843(931) 580-8894 (335) 689-2641 (ask for Sarai) www.als.org

 American Diabetes Association (671) 842-0568 www.diabetes.org

 American Heart Association (312) 101-0800 www.heart.org

 American Lung Association (081) 770-9514 www.lungusa.org

 American Parkinson Disease Association (149) 543-6135 www.apdaparkinson.org

 American [place] (993) 518-3129, (500) 526-5004 www.redcross.org

 Arthritis Foundation (759) 306-5376 www.arthritis.org

 Crohns & Colitis Foundation of American (299) 416-3458 www.ccfa.org/chapters/andrae

 National Multiple Sclerosis Society (330) 858-2175 www.nationalmssociety.org

 Myasthenia Gravis Foundation (376) 889-8862 www.myasthenia-ca.org

 National Stroke Association (367) 003-2551 www.stroke.org



CONSERVATORSHIP & GUARDIANSHIP:

AARP (670) 897-7689

 [place] Legal Services [phone] [phone]

 Center for Health Care Rights [phone]

 Eldercare Information and Referral [phone]

  [facility] [phone]



[facility]: 

[facility] Bar Referral Service [phone]

[facility] Neighborhood Legal Services [phone]

Office of the Public Guardian [place] [phone]



EYESIGHT DISORDER RESOURCES:

American Macular Degeneration Foundation (919) 150-6536

[facility] (504) 533-3311 www.[facility].org



GRIEF AND BEREAVEMENT RESOURCES:

 [facility] [place] ([phone], [facility] [phone]

 THE HOPE Connection ([phone] 175-1517, [facility] [phone]

[facility] [phone], [place]



HEARING DISORDER RESOURCES:

[place] Telephone Access Program Deaf and Disabled Telecommunications Program (729) 985-3184 www.ddtp.[facility].ca.gov

HearRx Hearing Centers ([place]) [phone]

Better Hearing Systems [phone], [place]

GLAD ([facility] Agency on Deafness) (679) 604-8122 V/ TTY;

Hearing Aid Specialist ([phone], [facility] Hearing [facility] -low income hearing aid assistance (339) 280-3168 
www.[facility] Centerfoundation.org 

[facility] [phone], [place]



HELP AT HOME  CAREGIVER SUPPORT:

 In Home Support Services  (Must have [facility] to be eligible)

([phone] *Ask for a list of agencies that provide services to assist with care in the 
home.  Local Senior Centers also have listings of care providers.



HOME SAFETY MODIFICATIONS AND EQUIPMENT:

Senior centers have additional referrals.

[place] Housing and Community Investment Dept. Handyworker Program (low income) (574) 671-8031 or 
(591) 100-8160 Visit http://hcidla.[facility].org/low-income-sr for more information

National Seating and Mobility (763)451-4495 and/or (059)810-6249;

Forever Active (462) 638-6151 www.foreQX Corporation

Stay Home Safe (003) 203-5430  www.Stayhomesafe.com

LIFE ALERT RESPONSE SYSTEM:

Metrilus Services 139-386-6688 www. avocadostore

Life Alert 137-945-0310 www.Selectica.Eqalix

Life Station 885-917-6006 www.Simperium.Eqalix

Safe Return 087-576-4958 www.Secoo.or/safereturn

Cell Phones for Seniors www.mgela0oyuvbhitzp.com



MEALS AND FOOD PROGRAMS:

[place] Meals on Wheels [phone]

[place] Meals on Wheels [phone]

[facility] [phone]

[place] to the Homebound [phone]

[place] to the Homebound [phone]

[facility] to the Homebound [phone]

[facility] to the Homebound [phone]

[facility] [phone]

[facility] [phone]

ONE Generation [phone]

[facility] [phone]

[facility] [phone]

Meals on Wheels [phone] For all ages: $6.85/ meal w side. Delivered M-F from 10 am-1pm. 
Application and payment is done over the phone. Frozen meals available for weekends. 

[facility] Food [facility] [phone]

[facility]  [phone]

[facility] [phone]

[facility]- Brown bag lunches [phone]

[facility] [phone]



MEAL/GROCERY DELIVERY PROGRAMS:

[facility] Gourmet Meals [phone]- [facility]

[phone]- [facility]

Magic Kitchen [phone]

Moms Meals [phone] (ask [place] for Discount

***Select grocery stores may provide delivery. 



MEDICAL INSURANCE SUPPORT SERVICES:

Center for Health Care Rights [phone]

Health Insurance Counseling/Advocacy Programs (HICAP)-Must have Medicare. Offers counseling 
for Medi-Lexi eligibility [phone]

Department of Public  260-604-2578 www.[facility].ca.gov

Medicare 341-806-4276 www.socialsecurity.org

Social Security [phone]



SENIOR ACTIVITY PROGRAMS:

*Contact a local senior center, adult school, recreation facility or community college for 
education, fitness, recreation, and social programs. 

Aquatic Therapy and Adapted Exercise programs through [facility] [phone]

[facility] at [facility] [phone] www.[facility]/encore

H2U- Senior Friends [phone]

[place] Senior Programs 043-873-9703 www.oasisnet.org

Suddenly 65 851-005-0548 www.fofpskua71.Eqalix



SENIOR CENTERS:

[facility] [phone]

[facility] [place] [phone]

[facility] [phone]

[facility] [phone]

[facility] [phone]

[facility] [phone]

[facility] [phone]

[facility] [phone]

[facility] GenerationAracelisVeterans Affairs [facility] [phone]

[facility] [phone]

[facility] [phone]

[facility] [phone]

[facility] [phone]



TRANSPORTATION:

Local [facility] may have applications for transportation programs and additional 
resources. 

ACCESS Services [phone] Transportation for seniors and disabled persons 7 days a week 

requiring 254 hr. advance reservation. Must apply and register for program rich eligible. 

CITY RIDE 159-673-0758 or 662-274-4294 Transportation for seniors and persons with ADA 
card/metro disabled card in the [facility]. M-F only. Must register for services. 

ONE GENERATION  [phone] Serves 65 years + in conjunction with city ride program. Must 
be registered with both programs.

A to B Transport [phone] Provides wheelchair/gurney van service.

Adult Medical Transport [phone] Accepts [facility] with prior authorization. 

Care Van [phone] Provides wheelchair Transport. 

[facility] Wide Transportation [phone] Provides gurney service

Gentle Care [phone] Gurney Transport.

[facility] Town Transportation [phone] wheelchair & gurney transport

D Transportation [phone] wheelchair & gurney transport

[place] Non-Emergency Transport [phone] wheelchair & gurney transport

Independent Living Center [phone] Short Term Transportation primarily for adults with   
disabilities on social security income. Nominal fee may apply and a reservation is required. 


[facility] [phone] or [phone]

[facility] [phone]

[address] [place] [address] Services -[phone] For additional programs & services 

VETERANS RESOURCES:

Submissions for Aid and Attendance should be done directly to Federal VA office locatd at : 
[facility] [address] [facility]. [facility] [address] [phone]

National Caregiver Support Line [phone]

[facility] Veterans Services Field Office [phone]

[place] Department of [place] Affairs [phone]

Pension Information [phone]

## 2022-05-23 NOTE — NUR
ICU RN CLOSING NOTES:



PATIENT IN BED SLEEPING BUT EASILY AROUSABLE,  ALERT/ORIENTED X3-4 AND VERBALLY RESPONSIVE. 
ON 3L/MIN VIA N/C, O2 SAT-93%. IV ACCESS ON PAULA PICC INTACT AND PATENT. RUNNING NS 125CC/HR 
AND MG 1 GM IV. NO C/O PAIN OR DISCOMFORT. NO ACUTE DISTRESS. PT USED BEDSIDE COMMODE ONE 
TIME.  ALL DUE MEDS GIVEN AS ORDERED. ALL SAFETY MEASURES IN PLACE. ON FALL PRECAUTION. BED 
IN LOWEST POSITION AND LOCKED. SIDE RAILS UP X2, PLACE CALL LIGHT WITH IN REACH. WILL 
ENDORSE TO MORNING SHIFT NURSE.

## 2022-05-24 VITALS — SYSTOLIC BLOOD PRESSURE: 118 MMHG | DIASTOLIC BLOOD PRESSURE: 70 MMHG

## 2022-05-24 VITALS — DIASTOLIC BLOOD PRESSURE: 53 MMHG | SYSTOLIC BLOOD PRESSURE: 103 MMHG

## 2022-05-24 VITALS — DIASTOLIC BLOOD PRESSURE: 64 MMHG | SYSTOLIC BLOOD PRESSURE: 121 MMHG

## 2022-05-24 VITALS — SYSTOLIC BLOOD PRESSURE: 124 MMHG | DIASTOLIC BLOOD PRESSURE: 58 MMHG

## 2022-05-24 VITALS — DIASTOLIC BLOOD PRESSURE: 67 MMHG | SYSTOLIC BLOOD PRESSURE: 120 MMHG

## 2022-05-24 VITALS — DIASTOLIC BLOOD PRESSURE: 44 MMHG | SYSTOLIC BLOOD PRESSURE: 98 MMHG

## 2022-05-24 LAB
BASOPHILS # BLD AUTO: 0 K/UL (ref 0–0.2)
BASOPHILS NFR BLD AUTO: 0.8 % (ref 0–2)
BUN SERPL-MCNC: 9 MG/DL (ref 7–18)
CALCIUM SERPL-MCNC: 8.5 MG/DL (ref 8.5–10.1)
CHLORIDE SERPL-SCNC: 109 MMOL/L (ref 98–107)
CO2 SERPL-SCNC: 28 MMOL/L (ref 21–32)
CREAT SERPL-MCNC: 1 MG/DL (ref 0.6–1.3)
EOSINOPHIL NFR BLD AUTO: 2.6 % (ref 0–6)
GLUCOSE SERPL-MCNC: 127 MG/DL (ref 74–106)
HCT VFR BLD AUTO: 31 % (ref 33–45)
HGB BLD-MCNC: 10.7 G/DL (ref 11.5–14.8)
LYMPHOCYTES NFR BLD AUTO: 1.4 K/UL (ref 0.8–4.8)
LYMPHOCYTES NFR BLD AUTO: 27.9 % (ref 20–44)
MAGNESIUM SERPL-MCNC: 1.7 MG/DL (ref 1.8–2.4)
MCHC RBC AUTO-ENTMCNC: 35 G/DL (ref 31–36)
MCV RBC AUTO: 93 FL (ref 82–100)
MONOCYTES NFR BLD AUTO: 0.8 K/UL (ref 0.1–1.3)
MONOCYTES NFR BLD AUTO: 14.5 % (ref 2–12)
NEUTROPHILS # BLD AUTO: 2.8 K/UL (ref 1.8–8.9)
NEUTROPHILS NFR BLD AUTO: 54.2 % (ref 43–81)
PHOSPHATE SERPL-MCNC: 1.9 MG/DL (ref 2.5–4.9)
PLATELET # BLD AUTO: 150 K/UL (ref 150–450)
POTASSIUM SERPL-SCNC: 4.3 MMOL/L (ref 3.5–5.1)
RBC # BLD AUTO: 3.33 MIL/UL (ref 4–5.2)
SODIUM SERPL-SCNC: 142 MMOL/L (ref 136–145)
WBC NRBC COR # BLD AUTO: 5.2 K/UL (ref 4.3–11)

## 2022-05-24 RX ADMIN — LEVETIRACETAM SCH MG: 250 TABLET, FILM COATED ORAL at 20:12

## 2022-05-24 RX ADMIN — CEFEPIME HYDROCHLORIDE SCH MLS/HR: 1 INJECTION, POWDER, FOR SOLUTION INTRAMUSCULAR; INTRAVENOUS at 08:38

## 2022-05-24 RX ADMIN — MIDODRINE HYDROCHLORIDE SCH MG: 5 TABLET ORAL at 12:28

## 2022-05-24 RX ADMIN — POLYETHYLENE GLYCOL 3350 SCH GM: 17 POWDER, FOR SOLUTION ORAL at 08:30

## 2022-05-24 RX ADMIN — TRAZODONE HYDROCHLORIDE SCH MG: 50 TABLET ORAL at 08:30

## 2022-05-24 RX ADMIN — LEVETIRACETAM SCH MG: 250 TABLET, FILM COATED ORAL at 08:30

## 2022-05-24 RX ADMIN — DOCUSATE SODIUM SCH MG: 50 LIQUID ORAL at 16:36

## 2022-05-24 RX ADMIN — GABAPENTIN SCH MG: 400 CAPSULE ORAL at 08:30

## 2022-05-24 RX ADMIN — DOCUSATE SODIUM SCH MG: 50 LIQUID ORAL at 08:30

## 2022-05-24 RX ADMIN — HEPARIN SODIUM SCH UNITS: 5000 INJECTION INTRAVENOUS; SUBCUTANEOUS at 20:15

## 2022-05-24 RX ADMIN — GABAPENTIN SCH MG: 400 CAPSULE ORAL at 20:13

## 2022-05-24 RX ADMIN — MIDODRINE HYDROCHLORIDE SCH MG: 5 TABLET ORAL at 16:33

## 2022-05-24 RX ADMIN — LISINOPRIL SCH MG: 20 TABLET ORAL at 08:36

## 2022-05-24 RX ADMIN — Medication PRN MG: at 05:51

## 2022-05-24 RX ADMIN — MIDODRINE HYDROCHLORIDE SCH MG: 5 TABLET ORAL at 08:29

## 2022-05-24 RX ADMIN — Medication PRN MG: at 13:16

## 2022-05-24 RX ADMIN — HEPARIN SODIUM SCH UNITS: 5000 INJECTION INTRAVENOUS; SUBCUTANEOUS at 08:35

## 2022-05-24 RX ADMIN — CITALOPRAM HYDROBROMIDE SCH MG: 10 TABLET ORAL at 08:31

## 2022-05-24 RX ADMIN — Medication PRN MG: at 19:15

## 2022-05-24 RX ADMIN — TRAZODONE HYDROCHLORIDE SCH MG: 50 TABLET ORAL at 16:37

## 2022-05-24 NOTE — NUR
TELE RN OPENING NOTE



RECEIVED PATIENT IN BED AWAKE, ALERT/ORIENTED X 4, ABLE TO MAKE NEEDS KNOWN. ON O2 AT 2LPM 
VIA NC, TOLERATING WELL WITH SATURATION OF 94%. BREATHING EVEN AND UNLABORED. NO SOB, NO 
DISTRESS OR DISCOMFORT NOTED. DENIES PAIN. NOTED WITH PAULA TRIPLE LUMEN CATH INTACT AND 
PATENT, NO INFILTRATION NOTED AND LT WRIST #20 G SL UNABLE TO FLUSH AT THIS TIME. ALL SAFETY 
MEASURES IMPLEMENTED. HOB ELEVATED, BED LOCKED AND IN LOWEST POSITION WITH SIDE RAILS UP X 
2. CALL LIGHT WITHIN REACH. BED ALARM ON. WILL CONTINUE TO MONITOR

## 2022-05-24 NOTE — NUR
RN CLOSING NOTE



NO SIGNIFICANT CHANGES FOR PATIENT THROUGHOUT SHIFT, PATIENT REMAINS IN STABLE CONDITION. 
PATIENT IN BED AWAKE, PATIENT ALERT/ORIENTED X 4. ON O2 AT 3LPM VIA NC, TOLERATING WELL.  
BREATHING EVEN AND UNLABORED. NO SOB, NO DISTRESS OR DISCOMFORT NOTED.  NOTED WITH PAULA 
TRIPLE LUMEN CATH AND  LT WRIST #20 G SL INTACT AND PATENT, NO S/S OF INFILTRATION NOTED.  
ALL DUE MEDS GIVEN AS ORDERED. KEPT PATIENT CLEAN DRY AND COMFORTABLE. ALL NEEDS 
ANTICIPATED. ALL SAFETY MEASURES IMPLEMENTED. HOB ELEVATED, BED LOCKED AND IN LOWEST 
POSITION WITH SIDE RAILS UP X 2. CALL LIGHT WITHIN REACH. BED ALARM ON. WILL ENDORSE TO 
ONCOMING NURSE FOR CONTINUITY OF CARE.

## 2022-05-24 NOTE — NUR
RN OPENING NOTE



RECEIVED PATIENT IN BED RESTING, PATIENT ALERT/ORIENTED X 4, ABLE TO MAKE NEEDS KNOWN. ON O2 
AT 3LPM VIA NC, TOLERATING WELL WITH SATURATION OF 95%. BREATHING EVEN AND UNLABORED. NO 
SOB, NO DISTRESS OR DISCOMFORT NOTED. DENIES PAIN. NOTED WITH PAULA TRIPLE LUMEN CATH INTACT 
AND PATENT INFUSING NS @ 125 ML/HR, LT WRIST #20 G SL INTACT AND PATENT, NO S/S OF 
INFILTRATION NOTED.  ALL SAFETY MEASURES IMPLEMENTED. HOB ELEVATED, BED LOCKED AND IN LOWEST 
POSITION WITH SIDE RAILS UP X 2. CALL LIGHT WITHIN REACH. BED ALARM ON. WILL CONTINUE TO 
MONITOR AND ASSESS FOR ANY CHANGES DURING SHIFT.

## 2022-05-24 NOTE — NUR
TELE RN NOTE

PT IN BED AWAKE, NO DISTRESS OR DISCOMFORT NOTED. PAIN MEDS GIVEN DURING THE SHIFT FOR 
BILATERAL LOWER LEG PAIN. IVF INFUSING WELL, NOS /S OF INFILTRATION NOTED. ALL NEEDS 
ATTENDED. WILL ENDORSE TO DAY SHIFT NURSE FOR CONTINUE TO CARE.

## 2022-05-25 VITALS — SYSTOLIC BLOOD PRESSURE: 116 MMHG | DIASTOLIC BLOOD PRESSURE: 58 MMHG

## 2022-05-25 VITALS — SYSTOLIC BLOOD PRESSURE: 108 MMHG | DIASTOLIC BLOOD PRESSURE: 59 MMHG

## 2022-05-25 VITALS — DIASTOLIC BLOOD PRESSURE: 75 MMHG | SYSTOLIC BLOOD PRESSURE: 123 MMHG

## 2022-05-25 VITALS — DIASTOLIC BLOOD PRESSURE: 56 MMHG | SYSTOLIC BLOOD PRESSURE: 116 MMHG

## 2022-05-25 VITALS — SYSTOLIC BLOOD PRESSURE: 122 MMHG | DIASTOLIC BLOOD PRESSURE: 73 MMHG

## 2022-05-25 RX ADMIN — POLYETHYLENE GLYCOL 3350 SCH GM: 17 POWDER, FOR SOLUTION ORAL at 08:32

## 2022-05-25 RX ADMIN — DOCUSATE SODIUM SCH MG: 50 LIQUID ORAL at 08:32

## 2022-05-25 RX ADMIN — Medication PRN MG: at 14:48

## 2022-05-25 RX ADMIN — TRAZODONE HYDROCHLORIDE SCH MG: 50 TABLET ORAL at 08:32

## 2022-05-25 RX ADMIN — DOCUSATE SODIUM SCH MG: 50 LIQUID ORAL at 16:39

## 2022-05-25 RX ADMIN — CITALOPRAM HYDROBROMIDE SCH MG: 10 TABLET ORAL at 08:45

## 2022-05-25 RX ADMIN — TRAZODONE HYDROCHLORIDE SCH MG: 50 TABLET ORAL at 16:56

## 2022-05-25 RX ADMIN — Medication PRN MG: at 08:45

## 2022-05-25 RX ADMIN — Medication PRN MG: at 02:33

## 2022-05-25 RX ADMIN — HEPARIN SODIUM SCH UNITS: 5000 INJECTION INTRAVENOUS; SUBCUTANEOUS at 08:33

## 2022-05-25 RX ADMIN — GABAPENTIN SCH MG: 400 CAPSULE ORAL at 08:32

## 2022-05-25 RX ADMIN — LEVETIRACETAM SCH MG: 250 TABLET, FILM COATED ORAL at 08:32

## 2022-05-25 NOTE — NUR
RN OPENING NOTE



RECEIVED PATIENT IN BED RESTING, PATIENT ALERT/ORIENTED X 4, ABLE TO MAKE NEEDS KNOWN. ON O2 
AT 2LPM VIA NC, TOLERATING WELL. BREATHING EVEN AND UNLABORED. NO SOB, NO DISTRESS OR 
DISCOMFORT NOTED. DENIES PAIN. NOTED WITH PAULA TRIPLE LUMEN CATH INTACT AND PATENT AND LT 
WRIST #20 G SL INTACT AND PATENT, NO S/S OF INFILTRATION NOTED.  ALL SAFETY MEASURES 
IMPLEMENTED. HOB ELEVATED, BED LOCKED AND IN LOWEST POSITION WITH SIDE RAILS UP X 2. CALL 
LIGHT WITHIN REACH. BED ALARM ON. WILL CONTINUE TO MONITOR AND ASSESS FOR ANY CHANGES DURING 
SHIFT.

## 2022-05-25 NOTE — NUR
RN NOTE



PATIENT DISCHARGED IN STABLE CONDITION. BREATHING EVEN AND UNLABORED. NO SIGNS OF ANY 
RESPIRATORY DISTRESS NOTED AT THE TIME. PATIENT AWAKE, ALERT/ORIENTED X 4, ABLE TO AMBULATE 
TO [facility] FOR TRANSFER. DISCHARGE INSTRUCTIONS SIGNED AND GIVEN TO PATIENT. ALL BELONGINGS 
ACCOUNTED FOR AND GIVEN TO PATIENT. ALL NEEDS ANTICIPATED. ALL DUE MEDS GIVEN AS ORDERED. 
KEPT PATIENT CLEAN DRY AND COMFORTABLE. PATIENT LEFT UNIT VIA [facility] WITH 2 CREW MEMBERS.

## 2022-05-25 NOTE — NUR
TELE RN CLOSING NOTE



PATIENT IN BED SLEEPING BUT EASILY AROUSABLE TO TOUCH AND VOICE, A/O X 4, ABLE TO MAKE NEEDS 
KNOWN. ON O2 AT 2LPM VIA NC, TOLERATING WELL WITH SATURATION OF 98%. BREATHING EVEN AND 
UNLABORED. ON TELEMONITORING CURRENTLY READING SINUS LUISA AT 53 BPM, WITH PAULA TRIPLE LUMEN 
CATH INTACT AND PATENT, ALL SAFETY MEASURES IMPLEMENTED. DUE MEDS GIVEN AS MD ORDERED, ALL  
NEEDS ATTENDED, BED LOCKED AND IN LOWEST POSITION WITH SIDE RAILS UP X 2. CALL LIGHT WITHIN 
REACH. BED ALARM ON. WILL ENDORSE TO AM SHIFT NURSE.

## 2022-06-14 ENCOUNTER — HOSPITAL ENCOUNTER (EMERGENCY)
Dept: HOSPITAL 54 - ER | Age: 57
Discharge: HOME | End: 2022-06-14
Payer: COMMERCIAL

## 2022-06-14 VITALS — HEIGHT: 65 IN | WEIGHT: 170 LBS | BODY MASS INDEX: 28.32 KG/M2

## 2022-06-14 VITALS — DIASTOLIC BLOOD PRESSURE: 75 MMHG | SYSTOLIC BLOOD PRESSURE: 121 MMHG

## 2022-06-14 DIAGNOSIS — G89.29: Primary | ICD-10-CM

## 2022-06-14 DIAGNOSIS — Z88.8: ICD-10-CM

## 2022-06-14 DIAGNOSIS — Z79.899: ICD-10-CM

## 2022-06-14 DIAGNOSIS — M79.604: ICD-10-CM

## 2022-06-14 DIAGNOSIS — Z60.2: ICD-10-CM

## 2022-06-14 DIAGNOSIS — M79.605: ICD-10-CM

## 2022-06-14 DIAGNOSIS — Z86.69: ICD-10-CM

## 2022-06-14 DIAGNOSIS — F17.200: ICD-10-CM

## 2022-06-14 LAB
ALBUMIN SERPL BCP-MCNC: 3.7 G/DL (ref 3.4–5)
ALP SERPL-CCNC: 69 U/L (ref 46–116)
ALT SERPL W P-5'-P-CCNC: 27 U/L (ref 12–78)
AST SERPL W P-5'-P-CCNC: 35 U/L (ref 15–37)
BASOPHILS # BLD AUTO: 0.1 K/UL (ref 0–0.2)
BASOPHILS NFR BLD AUTO: 1.2 % (ref 0–2)
BILIRUB DIRECT SERPL-MCNC: 0.2 MG/DL (ref 0–0.2)
BILIRUB SERPL-MCNC: 0.5 MG/DL (ref 0.2–1)
BUN SERPL-MCNC: 14 MG/DL (ref 7–18)
CALCIUM SERPL-MCNC: 8.4 MG/DL (ref 8.5–10.1)
CHLORIDE SERPL-SCNC: 105 MMOL/L (ref 98–107)
CO2 SERPL-SCNC: 30 MMOL/L (ref 21–32)
CREAT SERPL-MCNC: 1.1 MG/DL (ref 0.6–1.3)
EOSINOPHIL NFR BLD AUTO: 2.8 % (ref 0–6)
GLUCOSE SERPL-MCNC: 92 MG/DL (ref 74–106)
HCT VFR BLD AUTO: 36 % (ref 33–45)
HGB BLD-MCNC: 11.9 G/DL (ref 11.5–14.8)
LYMPHOCYTES NFR BLD AUTO: 2.4 K/UL (ref 0.8–4.8)
LYMPHOCYTES NFR BLD AUTO: 34.1 % (ref 20–44)
MCHC RBC AUTO-ENTMCNC: 33 G/DL (ref 31–36)
MCV RBC AUTO: 94 FL (ref 82–100)
MONOCYTES NFR BLD AUTO: 0.9 K/UL (ref 0.1–1.3)
MONOCYTES NFR BLD AUTO: 12.1 % (ref 2–12)
NEUTROPHILS # BLD AUTO: 3.5 K/UL (ref 1.8–8.9)
NEUTROPHILS NFR BLD AUTO: 49.8 % (ref 43–81)
PLATELET # BLD AUTO: 202 K/UL (ref 150–450)
POTASSIUM SERPL-SCNC: 3.9 MMOL/L (ref 3.5–5.1)
PROT SERPL-MCNC: 8.4 G/DL (ref 6.4–8.2)
RBC # BLD AUTO: 3.82 MIL/UL (ref 4–5.2)
SODIUM SERPL-SCNC: 141 MMOL/L (ref 136–145)
WBC NRBC COR # BLD AUTO: 7.1 K/UL (ref 4.3–11)

## 2022-06-14 RX ADMIN — TRAMADOL HYDROCHLORIDE ONE MG: 50 TABLET, FILM COATED ORAL at 14:44

## 2022-06-14 SDOH — SOCIAL STABILITY - SOCIAL INSECURITY: PROBLEMS RELATED TO LIVING ALONE: Z60.2

## 2022-06-14 NOTE — NUR
BIBS C/O BILATERAL LEG SWELLING AND PAIN 7/10 ON PAIN SCALE WAS D/C FROM 

FACILITY YESTERDAY AND UNLABLE TO MANAGE THE PAIN. PT AMBULATES WITH WALKER ON 
HER OWN. VITALS ARE WITHIN NORMAL LIMITS. AWAITING MD BLANKENSHIP.

## 2022-06-14 NOTE — NUR
SS Consult:



SS Consult requested for safe DC planning. The pt. is a 56-year-old  
female patient that came in for lower extremity edema & chronic pain per EMR. 
Upon SS consult, the pt. is A&O x 4 and makes appropriate eye contact. The pt. 
appears unkempt and presents with a dysphoric mood and affect. Pt. denies 
current SI/HI and denies current hallucinations. Pt. has normal thought process 
and speech is WNL. CONNER explored pt.'s living situation. Per the pt., was 
recently discharged from [facility] [place], [place] [id number] [phone]] because she concluded her physical therapy 
treatment. Per pt. she was allegedly discharged with Oxy Contin for the Chronic 
pain but now cannot find the prescription. Per pt. she already went to her 
primary care physician who refused to prescribe her these medications. Pt. 
stated that if she cannot be prescribed this medication here that she wants to 
go home[[address] [facility] [address]; [phone]]. Patient 
stated her ex-, Gray neal would be here in 5 minutes to provide 
transportation. SW explored pt.'s drug & ETOH use. Pt. denies drug or alcohol 
use. CONNER explored pt.'s mental health Hx. Patient states she sees a 
psychiatrist, Dr. Rodriguez. SW asked pt. about her support system. Pt. stated her 
ex-, Gray helps her as well as her aunt. SW explored if pt. received 
financial assistance. Patient states she receives SSI and they just cut her 
General Relief. 



Plan: Pt. stated she would like to go home to her apt[[[address] 
[facility] [address]; [phone]]. Patient stated her ex-, Gray neal would be providing transportation. CONNER discussed with Dr. Mackey. 



CONNER provided pt. with the following resources and pt. accepted them: 



ABUSE PREVENTION: 

ELDER ABUSE HOTLINE (24/7) [phone]

ADULT PROTECTIVE SERVICES HOTLINE [phone]

LONG-TERM CARE [facility] [phone] - sfv Region

AREA ON AGING (HOTLINE) [phone]



ADULT DAY HEALTH CARE CARE CENTERS: 

Private pay or Medi-lexi funded adult day care

 [facility] [phone]

 [facility] [phone], [facility] [phone], [facility] [phone], [facility] Care [phone], [facility] Adult Day Health Care [phone], [facility] Adult  Center [phone], [place]

ONE Generation Craig [phone], [facility] [phone], [place]



ALZHEIMER'S DISEASE/DEMENTIA: 

Alzheimer's Association Helpline (896) [phone]

 [facility] ([phone]) [phone] 
www.alz.org/[facility] Department of Aging (264) 265-1116 www.lacity.org

 Family Caregiver [place] ([phone]) 264-8300 www.caregiver.org

 [place] Caregiver Resources Center/Family Support (785) 882-9345 
www.[facility].org

CANCER RESOURCES: 

American Cancer Society (121) 973-7566 www.cancer.org

 Cancer Support Community (578) 601-4754 www.CancerSupportVvsb.org: CancerCare 
(657) 114-4568 www.cancercare.org

 [facility] Support Center ([phone]) [phone] www.[facility].org



COMMUNITY HEALTH ASSOCIATIONS:

AARP (209) 195-3730 www.aarp.org

 ALS Association (346) 252-8071(921) [phone] (721) [phone] (ask for Sarai) www.als.org

 American Diabetes Association (580) 898-9219 www.diabetes.org

 American Heart Association (776) 777-1647 www.heart.org

 American Lung Association (062) 517-0225 www.lungusa.org

 American Parkinson Disease Association (682) 947-0497 www.apdaparkinson.org

 American [place] (056) 108-6195, (846) 433-3991 www.redcross.org

 Arthritis Foundation (738) 992-7753 www.arthritis.org

 Crohn's & Colitis Foundation of American (563) 524-2167 
www.ccfa.org/chapters/andrae

 National Multiple Sclerosis Society (092) 728-7492 www.nationalmssociety.org

 Myasthenia Gravis Foundation (362) 464-1387 www.myasthenia-ca.org

 National Stroke Association (148) 004-5892 www.stroke.org



CONSERVATORSHIP & GUARDIANSHIP:

AARP (191) 887-8490

 [place] Cabral Legal Services [phone]

 Center for Health Care Rights ([phone]

 Eldercare Information and Referral [phone]

  [facility] [phone]



[facility]: 

[facility] Referral Service [phone]

[facility] Legal Services [phone]

Office of the Public Guardian [place] [phone]



EYESIGHT DISORDER RESOURCES:

American Macular Degeneration Foundation (342) 369-9070

[facility] (395) 420-2670 www.[facility].org



GRIEF AND BEREAVEMENT RESOURCES:

 [facility] [place] ([phone], [facility] [phone]

 THE [place] Connection [phone], [facility] [phone]

[facility] [phone], [place]



HEARING DISORDER RESOURCES:

[place] Telephone Access Program Deaf and Disabled Telecommunications 
Program (752) 753-5300 www.ddtp.cpu.ca.gov

HearRx Hearing Centers ([place]) ([phone]

Better Hearing Systems [phone], [place]

GLAD ([facility] Agency on Deafness) ([phone]) [phone] V/ TTY;

Hearing Aid Specialist [phone], [facility] -low income hearing aid assistance ([phone]) [phone] 
www.[facility]geni[facility] Hospitalringfoundation.org 

[facility] [phone], [place]



HELP AT HOME - CAREGIVER SUPPORT:

 In Home Support Services  (Must have Medi-Lexi to be eligible)

([phone] 812-5883 *Ask for a list of agencies that provide services to assist with 
care in the home.  Local Senior Centers also have listings of care providers.



HOME SAFETY MODIFICATIONS AND EQUIPMENT:

Senior centers have additional referrals.

[place] CityAds Media and [employer] Dept. Handyworker Program (low income) 
(324) 211-7747 or ([phone] 928-8118 Visit http://hcidla.[facility].org/low-income-sr 
for more information

National Seating and Mobility (157)687-2458 and/or (695)479-8118;

Forever Active (946) 201-7816 www.foreverSeaBright Insurance.Cell Cure Neurosciences

Stay Home Safe (641) 629-7597  www.Stayhomesafe.com

LIFE ALERT RESPONSE SYSTEM:

HotDog Systems Services 507-640-0711 www. Roombeats

Life Alert 615-468-9768 www.BetaStudios

Life Station 840-883-0482 www.AudiencePointation.Cell Cure Neurosciences

Safe Return 160-670-3148 www.alz.or/safereturn

Cell Phones for Seniors www.jjrgg1ifuhnvkpwm.com



MEALS AND FOOD PROGRAMS:

[place] Meals on Wheels [phone]

[place] Meals on Wheels [phone]

[facility] [phone]

[place] to the Homebound [phone]

[place] to the Homebound [phone]

[facility] to the Homebound [phone]

[facility] to the Homebound [phone]

Otoniel Carlisle [phone]

David Santiago Craig [phone]

ONE Generation [phone]

[facility] [phone]

[facility] [phone]

Meals on Wheels [phone] For all ages: $6.85/ meal w side. Delivered M-F 
from 10 am-1pm. Application and payment is done over the phone. Frozen meals 
available for weekends. 

Emergency Food Coalition [phone] [phone]

[facility]  [phone]

[facility] [phone]

[facility] Outreach- Brown bag lunches [phone]

[facility]AVA [facility] [phone]



MEAL/GROCERY DELIVERY PROGRAMS:

[facility]'s Senior Gourmet Meals [phone]- [facility]

[phone]- [facility]

Magic Kitchen [phone]

Mom's Meals [phone] (ask Chirinos for Discount

***Select grocery stores may provide delivery. 



MEDICAL INSURANCE SUPPORT SERVICES:

Center for Health Care Rights [phone]

Health Insurance Counseling/Advocacy Programs (HICAP)-Must have Medicare. 
Offers counseling for Medi-Lexi eligibility [phone]

[facility] Public  676-745-3142 www.[facility].ca.gov

Medicare 282-082-3744 www.socialsecurity.org

Social Security [phone]



SENIOR ACTIVITY PROGRAMS:

*Contact a local senior center, adult school, recreation facility or community 
college for education, fitness, recreation, and social programs. 

Aquatic Therapy and Adapted Exercise programs through [facility] [phone]

Encore at [facility] [phone] www.[facility]/encore

H2U- Senior Friends [phone]

[place] Senior Programs 339-964-2112 www.oasisnet.org

Suddenly 65 [phone] www.xnkswmef04.com



SENIOR CENTERS:

[facility] [phone]

[facility] [phone]

[facility] [phone]

[facility] P[facility]a [phone]

[facility] [phone]

[facility] [phone]

[facility] [phone]

[facility] [phone]

One Generation, [facility] [phone]

[facility] [phone]

[facility] [phone]

[facility] [phone]

[facility] [phone]



TRANSPORTATION:

Local [facility] Centers may have applications for transportation programs and 
additional resources. 

ACCESS Services [phone] Transportation for seniors and disabled persons 7 
days a week 

requiring 254 hr. advance reservation. Must apply and register for program rich 
eligible. 

CITY RIDE 590-684-2166 or [phone] Transportation for seniors and persons 
with ADA card/metro disabled card in the [facility]. M-F only. Must 
register for services. 

ONE GENERATION  [phone] Serves 65 years + in conjunction with city ride 
program. Must be registered with both programs.

A to B Transport [phone] Provides wheelchair/gurney van service.

Adult Medical Transport [phone] Accepts [facility] with prior authorization. 


Care Van [phone] Provides wheelchair Transport. 

[facility] Wide Transportation [phone] Provides gurney service

[facility] [phone] Gurney Transport.

[facility] Transportation [phone] wheelchair & gurney transport

[facility] Transportation [phone] wheelchair & gurney transport

Cantril Non-Emergency Transport [phone] wheelchair & gurney transport

[facility] Living Craig [phone] Short Term Transportation primarily for 
adults with   disabilities on social security income. Nominal fee may apply and 
a reservation is required. 

City Cab [phone] or [phone]

Education Networks of America [phone]

[address] [place] [address] Referral Services -[phone] For additional programs & 
services 

VETERANS RESOURCES:

Submissions for Aid and Attendance should be done directly to Federal VA office 
locatd at : [address] [facility] [address] 
[phone]

National Caregiver Support Line 586-575-8298

Lexi Lundy Veterans Services Field Office [phone]

[place] Department of [place] Affairs [phone]

Pension Information [phone]

Counseling--Outpatient

[facility]

[address]

[place] [address]

[phone]

(Specializes in in-depth psychotherapy for emotional distress: anxiety, 
depression, interpersonal conflicts, life transitions, childhood abuse)



Community Guidance Center

[address] [place] [address] [phone]

(Assist with solving problem marital difficulties, separation & divorce, aging 
parents, death & grief, chronic & terminal illness)



Family Counseling Center

[address] [place] [address] [phone]

(Deal with loss & grief, anxiety, marital difficulties) 



Homebound/Mental Health Services

[address]

[place] [address] [phone]

(Provide in-home mental services to people who are incapable of leaving their 
homes)



Organization for Needs of the Elderly

Senior Service/Resource Center

[address] [place] [address] [phone]



[facility] 

[address]

[place] [address] [phone]

Mental Health Services

[facility]

 [address] [place] [address] [phone]

Services: Outpatient therapy for children, teens, young adults, adults, older 
adults, and families; Psychiatric services, medication support



Psychiatric Outpatient Services



[facility] Partial Hospitalization and Intensive Outpatient Program 
(Managed Care and [place] Only)[address] [facility] [phone]

[facility]

Partial Hospitalization and Outpatient M[address]rxap[address] [facility]  [address]

[place], [place] [phone]

[facility] [id number] [facility] 
[address]

[place], [place] [phone]

[facility] Partial Hospitalization and Outpatient 
Ymhtjhm[address] [place], [phone]



Crisis and Hotline Telephone Numbers

24-Hour service unless stated 

[place] Crisis Hotlines:



[place] Mental Health/Crisis Line........747.682.6172

Suicide Prevention Center (24 Hours).......[phone]

Suicide Prevention Crisis Center.......115.193.5339 (24 Hours)

 Assaults Against Women Hotline.........149.600.1068 (24 Hours -- [facility])

Women and Children Crisis Shelter...........603.431.4563 (24 Hours)

Child Abuse Hotline............622.636.2291 [facility] 
Services

Rape Treatment Center (24 Hours)..........[phone]

Alcoholics Anonymous (24 Hours)..........[phone]

Cocaine Anonymous (24 Hours)............[phone]

Narcotics Anonymous (24 Hours)..........[phone]







[place] Pending sale to [facility]



[address], [place] [address] [phone]